# Patient Record
Sex: FEMALE | Race: WHITE | Employment: UNEMPLOYED | URBAN - METROPOLITAN AREA
[De-identification: names, ages, dates, MRNs, and addresses within clinical notes are randomized per-mention and may not be internally consistent; named-entity substitution may affect disease eponyms.]

---

## 2019-05-27 ENCOUNTER — HOSPITAL ENCOUNTER (EMERGENCY)
Age: 39
Discharge: LEFT BEFORE DISCHARGE | End: 2019-05-27
Attending: EMERGENCY MEDICINE
Payer: COMMERCIAL

## 2019-05-27 ENCOUNTER — APPOINTMENT (OUTPATIENT)
Dept: GENERAL RADIOLOGY | Age: 39
End: 2019-05-27
Attending: EMERGENCY MEDICINE
Payer: COMMERCIAL

## 2019-05-27 VITALS
WEIGHT: 123 LBS | HEART RATE: 88 BPM | TEMPERATURE: 98 F | OXYGEN SATURATION: 100 % | BODY MASS INDEX: 20.49 KG/M2 | SYSTOLIC BLOOD PRESSURE: 100 MMHG | HEIGHT: 65 IN | DIASTOLIC BLOOD PRESSURE: 55 MMHG | RESPIRATION RATE: 14 BRPM

## 2019-05-27 DIAGNOSIS — R07.9 CHEST PAIN OF UNCERTAIN ETIOLOGY: Primary | ICD-10-CM

## 2019-05-27 PROCEDURE — 81025 URINE PREGNANCY TEST: CPT

## 2019-05-27 PROCEDURE — 93010 ELECTROCARDIOGRAM REPORT: CPT

## 2019-05-27 PROCEDURE — 84443 ASSAY THYROID STIM HORMONE: CPT | Performed by: EMERGENCY MEDICINE

## 2019-05-27 PROCEDURE — 96360 HYDRATION IV INFUSION INIT: CPT

## 2019-05-27 PROCEDURE — 85025 COMPLETE CBC W/AUTO DIFF WBC: CPT | Performed by: EMERGENCY MEDICINE

## 2019-05-27 PROCEDURE — 85652 RBC SED RATE AUTOMATED: CPT | Performed by: EMERGENCY MEDICINE

## 2019-05-27 PROCEDURE — 83690 ASSAY OF LIPASE: CPT | Performed by: EMERGENCY MEDICINE

## 2019-05-27 PROCEDURE — 99285 EMERGENCY DEPT VISIT HI MDM: CPT

## 2019-05-27 PROCEDURE — 71045 X-RAY EXAM CHEST 1 VIEW: CPT | Performed by: EMERGENCY MEDICINE

## 2019-05-27 PROCEDURE — 80053 COMPREHEN METABOLIC PANEL: CPT | Performed by: EMERGENCY MEDICINE

## 2019-05-27 PROCEDURE — 84484 ASSAY OF TROPONIN QUANT: CPT | Performed by: EMERGENCY MEDICINE

## 2019-05-27 PROCEDURE — 81003 URINALYSIS AUTO W/O SCOPE: CPT | Performed by: EMERGENCY MEDICINE

## 2019-05-27 PROCEDURE — 93005 ELECTROCARDIOGRAM TRACING: CPT

## 2019-05-27 PROCEDURE — 84439 ASSAY OF FREE THYROXINE: CPT | Performed by: EMERGENCY MEDICINE

## 2019-05-27 RX ORDER — ONDANSETRON 2 MG/ML
4 INJECTION INTRAMUSCULAR; INTRAVENOUS ONCE
Status: DISCONTINUED | OUTPATIENT
Start: 2019-05-27 | End: 2019-05-28

## 2019-05-27 RX ORDER — KETOROLAC TROMETHAMINE 30 MG/ML
15 INJECTION, SOLUTION INTRAMUSCULAR; INTRAVENOUS ONCE
Status: DISCONTINUED | OUTPATIENT
Start: 2019-05-27 | End: 2019-05-28

## 2019-05-27 RX ORDER — ASPIRIN 81 MG/1
324 TABLET, CHEWABLE ORAL ONCE
Status: COMPLETED | OUTPATIENT
Start: 2019-05-27 | End: 2019-05-27

## 2019-05-27 RX ORDER — HYDROMORPHONE HYDROCHLORIDE 1 MG/ML
0.5 INJECTION, SOLUTION INTRAMUSCULAR; INTRAVENOUS; SUBCUTANEOUS EVERY 30 MIN PRN
Status: DISCONTINUED | OUTPATIENT
Start: 2019-05-27 | End: 2019-05-28

## 2019-05-28 NOTE — ED NOTES
Patient states pain has resolved since receiving aspirin. She declines Zofran, Toradol, and dilaudid.

## 2019-05-28 NOTE — ED INITIAL ASSESSMENT (HPI)
Patient c/o mid sternal chest pain that radiates to mid back starting one hour ago. +nausea. States that pain worsens with deep inspiration.

## 2019-05-28 NOTE — ED PROVIDER NOTES
Patient Seen in: THE [facility] Emergency Department In [place]    History   Patient presents with:  Chest Pain Angina (cardiovascular)    Stated Complaint: CHEST PAIN    HPI    About an hour before coming to the hospital the patient developed sharp pain to t Palpation of the right sternal border does seem to reproduce the patient's pain. Heart: Apical pulse 84 and regular without murmur or rub. Abdomen: Soft and nontender without mass or HSM.   No CVA tenderness  Extremities: No peripheral edema or evidence o MDM     CTA chest ordered to rule out with serious issues such as pulmonary embolism or aortic dissection. While waiting for testing to be done the patient noted that the pain completely resolved and she was feeling fine.   At that point she decided

## 2019-12-22 NOTE — LETTER
Date & Time: 5/27/2019, 11:13 PM  Patient: Emma Headley  Encounter Provider(s):    Boston Ryan MD         This certifies that I, Emma Headley, a patient at an [facility], am leaving the facility voluntarily and against th
Statement Selected

## 2021-06-11 ENCOUNTER — TELEPHONE (OUTPATIENT)
Dept: GASTROENTEROLOGY | Age: 41
End: 2021-06-11

## 2021-06-30 ENCOUNTER — OFFICE VISIT (OUTPATIENT)
Dept: GASTROENTEROLOGY | Age: 41
End: 2021-06-30

## 2021-06-30 ENCOUNTER — TELEPHONE (OUTPATIENT)
Dept: GASTROENTEROLOGY | Age: 41
End: 2021-06-30

## 2021-06-30 VITALS — BODY MASS INDEX: 20.16 KG/M2 | WEIGHT: 121 LBS | HEIGHT: 65 IN

## 2021-06-30 DIAGNOSIS — K52.9 CHRONIC DIARRHEA: Primary | ICD-10-CM

## 2021-06-30 PROCEDURE — 99204 OFFICE O/P NEW MOD 45 MIN: CPT | Performed by: INTERNAL MEDICINE

## 2021-06-30 RX ORDER — SOD SULF/POT CHLORIDE/MAG SULF 1.479 G
12 TABLET ORAL SEE ADMIN INSTRUCTIONS
Qty: 24 TABLET | Refills: 0 | Status: SHIPPED | OUTPATIENT
Start: 2021-06-30

## 2021-06-30 RX ORDER — CEPHRADINE 500 MG
1 CAPSULE ORAL DAILY
COMMUNITY

## 2021-06-30 RX ORDER — CHOLECALCIFEROL (VITAMIN D3) 1250 MCG
1 CAPSULE ORAL
COMMUNITY

## 2021-06-30 ASSESSMENT — ENCOUNTER SYMPTOMS
SORE THROAT: 0
DIAPHORESIS: 0
SPEECH DIFFICULTY: 0
CONSTIPATION: 0
BACK PAIN: 0
BLOOD IN STOOL: 0
LIGHT-HEADEDNESS: 0
TREMORS: 0
HEADACHES: 0
UNEXPECTED WEIGHT CHANGE: 0
ABDOMINAL PAIN: 0
APPETITE CHANGE: 0
SEIZURES: 0
EYE REDNESS: 0
CHEST TIGHTNESS: 0
NAUSEA: 0
FATIGUE: 0
DIARRHEA: 1
ANAL BLEEDING: 0
BRUISES/BLEEDS EASILY: 0
COUGH: 0
RECTAL PAIN: 0
CHOKING: 0
ABDOMINAL DISTENTION: 1
CONFUSION: 0
COLOR CHANGE: 0
CHILLS: 0
SHORTNESS OF BREATH: 0
VOMITING: 0
EYE PAIN: 0

## 2021-06-30 ASSESSMENT — PAIN SCALES - GENERAL: PAINLEVEL: 0

## 2021-07-02 ENCOUNTER — APPOINTMENT (OUTPATIENT)
Dept: URGENT CARE | Age: 41
End: 2021-07-02

## 2021-07-06 ENCOUNTER — APPOINTMENT (OUTPATIENT)
Dept: GASTROENTEROLOGY | Age: 41
End: 2021-07-06
Attending: INTERNAL MEDICINE

## 2021-08-04 PROBLEM — K52.9 CHRONIC DIARRHEA: Status: ACTIVE | Noted: 2021-06-30

## 2024-07-24 ENCOUNTER — HOSPITAL ENCOUNTER (OUTPATIENT)
Age: 44
Discharge: HOME | End: 2024-07-24
Payer: COMMERCIAL

## 2024-07-24 VITALS
RESPIRATION RATE: 18 BRPM | SYSTOLIC BLOOD PRESSURE: 94 MMHG | HEART RATE: 84 BPM | DIASTOLIC BLOOD PRESSURE: 54 MMHG | OXYGEN SATURATION: 98 % | TEMPERATURE: 98 F

## 2024-07-24 DIAGNOSIS — H65.03 BILATERAL ACUTE SEROUS OTITIS MEDIA, RECURRENCE NOT SPECIFIED: Primary | ICD-10-CM

## 2024-07-24 PROCEDURE — 99204 OFFICE O/P NEW MOD 45 MIN: CPT | Performed by: NURSE PRACTITIONER

## 2024-07-24 RX ORDER — IVERMECTIN 3 MG/1
TABLET ORAL
COMMUNITY
Start: 2024-06-06

## 2024-07-24 NOTE — DISCHARGE INSTRUCTIONS
Claritin D 1 tablet once a day for 7 days  Flonase nasal spray 2 sprays in each nostril daily  Return for worsening symptoms  Follow-up with ENT in 7 days if no improvement

## 2024-07-24 NOTE — ED PROVIDER NOTES
Chief Complaint   Patient presents with    Ear Problem       HPI:     Evis Budina is a 44 year old female who presents with a chief complaint of left ear pain that started this morning.  She reports some nasal congestion earlier however that has since resolved.  She started taking Claritin-D this morning.  She has had no fever.  No URI symptoms.    PFSH  PFSH asessment screens reviewed and agree.  Nursing note reviewed and I agree with documentation.    Family History   Problem Relation Age of Onset    Breast Cancer Mother 38    Colon Polyps Father     Breast Cancer Maternal Grandmother 70    Breast Cancer Maternal Aunt 42     Family history reviewed with patient/caregiver and is not pertinent to presenting problem.  Social History     Socioeconomic History    Marital status:      Spouse name: Not on file    Number of children: Not on file    Years of education: Not on file    Highest education level: Not on file   Occupational History    Not on file   Tobacco Use    Smoking status: Never    Smokeless tobacco: Never   Vaping Use    Vaping status: Never Used   Substance and Sexual Activity    Alcohol use: No    Drug use: No    Sexual activity: Not on file   Other Topics Concern    Not on file   Social History Narrative    Not on file     Social Determinants of Health     Financial Resource Strain: Not on file   Food Insecurity: Not on file   Transportation Needs: Not on file   Physical Activity: Not on file   Stress: Not on file   Social Connections: Not on file   Housing Stability: Not on file         ROS:   Positive for stated complaint: ear problem  All other systems reviewed and negative except as noted above.  Constitutional and Vital Signs Reviewed.      Physical Exam:     Findings:    BP 94/54   Pulse 84   Temp 97.9 °F (36.6 °C) (Temporal)   Resp 18   LMP 07/16/2024 (Approximate)   SpO2 98%   GENERAL: well developed, well nourished, well hydrated, no distress  HEAD: normocephalic  NECK: supple, no  adenopathy  EYES: sclera non icteric bilateral, conjunctiva clear  EARS: bilateral TMs with clear fluid.  Normal external canals bilaterally.  No mastoid tenderness bilaterally.  NOSE: nasal turbinates: swollen, red, and clear drainage  THROAT: clear, without exudates  CARDIO: RRR without murmur  LUNGS: clear to auscultation bilaterally; no rales, rhonchi, or wheezes  EXTREMITIES: no cyanosis or edema. KIRK without difficulty  SKIN: good skin turgor, no obvious rashes    MDM/Assessment/Plan:   Orders for this encounter:    No orders of the defined types were placed in this encounter.      Labs performed this visit:  No results found for this or any previous visit (from the past 10 hour(s)).    MDM:  Medical Decision Making  Differentials considered: Otitis media versus otitis externa versus serous otitis versus mastoiditis versus other    HPI and exam most consistent with bilateral serous otitis.  No signs of infection on exam.  No mastoid tenderness bilaterally.  Discussed Claritin-D along with Flonase.  Advised follow-up with ENT in 1 week if no improvement.  Patient verbalized understanding and agreeable to plan care.    Risk  OTC drugs.        Diagnosis:    ICD-10-CM    1. Bilateral acute serous otitis media, recurrence not specified  H65.03           All results reviewed and discussed with patient.  See AVS for detailed discharge instructions for your condition today.    Follow Up with:  Trae Andrews MD  [address] [place] [address] [id number] [phone]

## 2024-09-21 ENCOUNTER — APPOINTMENT (OUTPATIENT)
Dept: CT IMAGING | Facility: HOSPITAL | Age: 44
End: 2024-09-21
Attending: NURSE PRACTITIONER
Payer: COMMERCIAL

## 2024-09-21 ENCOUNTER — APPOINTMENT (OUTPATIENT)
Dept: GENERAL RADIOLOGY | Age: 44
End: 2024-09-21
Attending: NURSE PRACTITIONER
Payer: COMMERCIAL

## 2024-09-21 ENCOUNTER — HOSPITAL ENCOUNTER (OUTPATIENT)
Age: 44
Discharge: HOME | End: 2024-09-21
Payer: COMMERCIAL

## 2024-09-21 VITALS
OXYGEN SATURATION: 100 % | SYSTOLIC BLOOD PRESSURE: 116 MMHG | HEART RATE: 76 BPM | RESPIRATION RATE: 20 BRPM | DIASTOLIC BLOOD PRESSURE: 46 MMHG | TEMPERATURE: 99 F

## 2024-09-21 DIAGNOSIS — S52.612A CLOSED DISPLACED FRACTURE OF STYLOID PROCESS OF LEFT ULNA, INITIAL ENCOUNTER: ICD-10-CM

## 2024-09-21 DIAGNOSIS — S62.115A NONDISPLACED FRACTURE OF TRIQUETRUM (CUNEIFORM) BONE, LEFT WRIST, INITIAL ENCOUNTER FOR CLOSED FRACTURE: ICD-10-CM

## 2024-09-21 DIAGNOSIS — S52.572A OTHER CLOSED INTRA-ARTICULAR FRACTURE OF DISTAL END OF LEFT RADIUS, INITIAL ENCOUNTER: Primary | ICD-10-CM

## 2024-09-21 PROCEDURE — 29125 APPL SHORT ARM SPLINT STATIC: CPT | Performed by: NURSE PRACTITIONER

## 2024-09-21 PROCEDURE — A4565 SLINGS: HCPCS | Performed by: NURSE PRACTITIONER

## 2024-09-21 PROCEDURE — 73110 X-RAY EXAM OF WRIST: CPT | Performed by: NURSE PRACTITIONER

## 2024-09-21 PROCEDURE — 73200 CT UPPER EXTREMITY W/O DYE: CPT | Performed by: NURSE PRACTITIONER

## 2024-09-21 PROCEDURE — 73630 X-RAY EXAM OF FOOT: CPT | Performed by: NURSE PRACTITIONER

## 2024-09-21 PROCEDURE — 99213 OFFICE O/P EST LOW 20 MIN: CPT | Performed by: NURSE PRACTITIONER

## 2024-09-21 NOTE — ED QUICK NOTES
Bed: 16  Expected date: 12/23/23  Expected time:   Means of arrival: [facility]  Comments:  51 year old female coming from home c/o bilateral leg pain. H/O psych issues.  Is refusing all vital signs at this time.    Report received by Danay FORBES   Pt transferred by provider to [facility] for outpatient testing.

## 2024-09-21 NOTE — ED PROVIDER NOTES
Patient Seen in: [facility]    History   CC: fall   HPI: Evis Budina 44 year old female  who presents c/o left wrist and foot pain status post fall today approximately noon in which patient states she missed her footing on the last 2 steps of stairs going into her garage.  Denies hitting her head or loss of consciousness.  Denies neck or back pain.  Difficulty with any range of motion at the level of left wrist due to discomfort.  Denies open wounds.  Driven here by her son.    History reviewed. No pertinent past medical history.    Past Surgical History:   Procedure Laterality Date          Colonoscopy N/A 2021       Family History   Problem Relation Age of Onset    Breast Cancer Mother 38    Colon Polyps Father     Breast Cancer Maternal Grandmother 70    Breast Cancer Maternal Aunt 42       Social History     Socioeconomic History    Marital status:    Tobacco Use    Smoking status: Never    Smokeless tobacco: Never   Vaping Use    Vaping status: Never Used   Substance and Sexual Activity    Alcohol use: No    Drug use: No       ROS:  Review of Systems    Positive for stated complaint: Fall  Other systems are as noted in HPI.  Constitutional and vital signs reviewed.      All other systems reviewed and negative except as noted above.    PSFH elements reviewed from today and agreed except as otherwise stated in HPI.             Constitutional and vital signs reviewed.        Physical Exam     ED Triage Vitals   BP 24 1234 116/46   Pulse 24 1234 76   Resp 24 1234 20   Temp 24 1234 98.6 °F (37 °C)   Temp src 24 1234 Temporal   SpO2 24 1234 100 %   O2 Device 24 1232 None (Room air)       Current:/46   Pulse 76   Temp 98.6 °F (37 °C) (Temporal)   Resp 20   LMP 2024 (Exact Date)   SpO2 100%         PE:  General - Appears well, non-toxic and in NAD  Head - Appears symmetrical without deformity/swelling cranium, scalp, or facial  bones  Eyes - sclera not injected, no discharge noted, no periorbital edema  Neck - supple with trachea midline, no tenderness upon palpation to posterior c-spine, no obvious sign of trauma/swelling/step-off, full ROM with strong motor strength against resistance  Resp - Lung sounds clear bilaterally and wob unlabored, good aeration with equal, even expansion bilaterally   CV - RRR  Skin - no rashes or petechiae noted, pink warm and dry throughout, mmm, cap refill <2seconds  Neuro - A&O x4, sensation equal to both medial and lateral aspects of extremities, steady gait  MSK - makes purposeful movements of all extremities however difficulty with range of motion at the level of the left wrist due to discomfort, finger flexion/extension as well as foot plantar flexion/dorsiflexion strength equal bilat, radial and pedal pulses 2+ bilat.  no midline spinal tenderness or obvious sign of trauma/swelling/deformity, +flexion of the 1st and 5th toes bilat.  + Tenderness is elicited with palpation to the left wrist diffuse.  No hand overlying metacarpals 1 through 5, forearm, elbow, upper arm, shoulder of the left upper extremity on exam.  + Tenderness is elicited with palpation to the proximal, dorsal left foot overlying base of the metatarsals 3 and 4.  No other foot or toe tenderness.  No tenderness in the left ankle, shin, calf, knee, thigh or hip left lower extremity.  Psych - Interactive and appropriate      ED Course   Labs Reviewed - No data to display    MDM     XR WRIST COMPLETE (MIN 3 VIEWS), LEFT (CPT=73110) (Final result)  Result time 09/21/24 13:18:46  Final result by Hernandez Draper MD (09/21/24 13:18:46)                Impression:    CONCLUSION:  1. Acute comminuted fractures of the distal left radial metaphysis with intra-articular extension to the radiocarpal joint.     2. Acute displaced fracture of the left ulnar styloid process.           Dictated by (CST): Hernandez Draper MD on 9/21/2024 at 1:17 PM       Finalized by (CST): Hernandez Draper MD on 9/21/2024 at 1:18 PM                  Narrative:    PROCEDURE: XR WRIST COMPLETE (MIN 3 VIEWS), LEFT (CPT=73110)     COMPARISON: None available.     INDICATIONS: Dorsal and radial left wrist pain and swelling after sustaining a fall delete.     TECHNIQUE: 3 views were obtained.       FINDINGS:  BONES: A transversely oriented comminuted and slightly impacted fracture of the distal left radial metaphysis is apparent. Intra-articular extension to the radiocarpal joint is suggested. A minimally displaced acute ulnar styloid process fracture is  noted. There is no evidence of significant arthropathy. The intercarpal distances are preserved. The carpal arcs are well aligned.  SOFT TISSUES: Circumferential soft tissue swelling is apparent. Negative for discernible radiopaque foreign body.  EFFUSION: None visible.                                       XR FOOT, COMPLETE (MIN 3 VIEWS), LEFT (CPT=73630) (Final result)  Result time 09/21/24 13:23:01  Final result by Hernandez Draper MD (09/21/24 13:23:01)                Impression:    CONCLUSION:  1. No radiographically visible acute osseous injury of the left foot.     2. 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     3. Left calcaneal enthesopathy.           Dictated by (CST): Hernandez Draper MD on 9/21/2024 at 1:21 PM      Finalized by (CST): Hernandez Draper MD on 9/21/2024 at 1:22 PM                  Narrative:    PROCEDURE: XR FOOT, COMPLETE (MIN 3 VIEWS), LEFT (CPT=73630)     COMPARISON: None available.     INDICATIONS: Pain along lateral aspect of the left foot after sustaining a fall.     TECHNIQUE: 3 views were obtained without weightbearing technique.       FINDINGS:  BONES: No acute fracture or dislocation is evident. No suspicious osseous lesions are seen. The interphalangeal joint spaces are narrowed with evidence of mild degenerative arthropathy. There is mild degeneration of the hallux-sesamoid  complex. Calcaneal   enthesopathy is evident at the insertion of the Achilles tendon. An accessory os perineum is suggested.  SOFT TISSUES: Negative for visible soft tissue swelling or radiopaque foreign body.    EFFUSION: None visible.                 CT WRIST LEFT (CPT=73200) (Final result)  Result time 09/21/24 15:33:57  Final result by Charles Estevez MD (09/21/24 15:33:57)                Impression:    CONCLUSION:  1. Acute mildly displaced and comminuted distal radius fracture with intra-articular extension.  There is slight dorsal angulation of the principal distal fragment (Colles fracture).  2. Additional acute minimally displaced fracture at the tip of the ulnar styloid process.  3. There is also an acute nondisplaced fracture along the dorsal margin of the triquetrum.  4. Lesser incidental findings as above.        Dictated by (CST): Charles Estevez MD on 9/21/2024 at 3:31 PM      Finalized by (CST): Charles Estevez MD on 9/21/2024 at 3:33 PM                  Narrative:    PROCEDURE: CT WRIST LEFT (CPT=73200)     COMPARISON: [facility], XR WRIST COMPLETE (MIN 3 VIEWS), LEFT (CPT=73110), 9/21/2024, 12:49 PM.     INDICATIONS: fall down two stairs today. +fx comminuted distal left radial metaphysis with extension into the radiocarpal joint     TECHNIQUE: Multi-planar CT images of the left wrist were obtained without intravenous contrast material.  Automated exposure control for dose reduction was used. Adjustment of the mA and/or kV was done based on the patient's size. Use of iterative  reconstruction technique for dose reduction was used.  Dose information is transmitted to the ACR (American College of Radiology) NRDR (National Radiology Data Registry) which includes the Dose Index Registry.     FINDINGS:  BONES: Acute displaced and comminuted distal radius fracture with intra-articular extension.  There is also an acute minimally displaced fracture at the ulnar styloid  process.  Additional acute nondisplaced fracture along the dorsal margin of the  triquetrum.  Demineralization.  SOFT TISSUES: Radiocarpal and probable distal radial ulnar joint effusions.  OTHER: Overlying cast material reduces sensitivity for fine detail.              DDx: Fracture versus sprain versus contusion    X-ray results as noted above and independently reviewed by this provider.  No acute osseous abnormality in the left foot however there is a displaced ulnar styloid process fracture as well as left distal radial metaphysis fracture with intra-articular extension.  This was discussed with Dr. Urban Smith, upper extremity orthopedist with [facility] as well as images of x-ray sent and message.  He is unsure whether patient is candidate for conservative management versus surgical management and requests CT for close follow-up with his office in the next few days.  CT obtained and results as noted above.  Additional triquetrium fracture, nondisplaced noted on CT per radiologist.  Patient is aware CT results will not change outcome of today's visit however we will need this for follow-up with orthopedist.  General wrist fracture and OCL instructions, sling instructions and precautions, rest, ice, elevation, Tylenol or Motrin as needed for discomfort, follow-up and return/ED precautions reviewed.  Patient offered Norco while in the immediate care and she declines.  Patient is historian and demonstrates understanding of all instruction and agrees with plan of care.    A short arm OCL splint was applied. After application of the splint I returned and re-examined the patient. The splint was adequately immobilizing the joint and distal to the splint the patient's circulation and sensation was intact.    Disposition and Plan     Clinical Impression:  1. Other closed intra-articular fracture of distal end of left radius, initial encounter    2. Closed displaced fracture of styloid process of left ulna, initial  encounter    3. Nondisplaced fracture of triquetrum (cuneiform) bone, left wrist, initial encounter for closed fracture        Disposition:  Discharge    Follow-up:  Urban Smith MD  [address],  [place] [address] [phone]    Go in 3 days        Medications Prescribed:  Discharge Medication List as of 9/21/2024  2:02 PM

## 2024-09-21 NOTE — ED INITIAL ASSESSMENT (HPI)
Pt states she was going down 2 steps into garage and fell onto concrete floor at 12pm today.  Pt c/o L wrist and L foot pain.  No head injury.  No LOC.  Ibuprofen last dose at 1215pm today.

## 2024-09-24 ENCOUNTER — TELEPHONE (OUTPATIENT)
Facility: CLINIC | Age: 44
End: 2024-09-24

## 2024-09-24 DIAGNOSIS — M25.532 LEFT WRIST PAIN: Primary | ICD-10-CM

## 2024-09-24 NOTE — TELEPHONE ENCOUNTER
Patient scheduled via [employer]. XR and CT from 9/21/24 in epic. Please review imaging and advise if appt is ok and if you would like repeat imaging.

## 2024-09-24 NOTE — TELEPHONE ENCOUNTER
[facility] Imaging in epic, please advise if additional imaging needed.     Future Appointments   Date Time Provider Department Center   9/25/2024  2:00 PM Urban Smith MD [facility] [place]

## 2024-09-25 ENCOUNTER — HOSPITAL ENCOUNTER (OUTPATIENT)
Dept: GENERAL RADIOLOGY | Age: 44
Discharge: HOME | End: 2024-09-25
Attending: ORTHOPAEDIC SURGERY
Payer: COMMERCIAL

## 2024-09-25 ENCOUNTER — OFFICE VISIT (OUTPATIENT)
Dept: ORTHOPEDICS CLINIC | Facility: CLINIC | Age: 44
End: 2024-09-25
Payer: COMMERCIAL

## 2024-09-25 VITALS — WEIGHT: 125 LBS | HEIGHT: 65 IN | BODY MASS INDEX: 20.83 KG/M2

## 2024-09-25 DIAGNOSIS — M25.532 LEFT WRIST PAIN: ICD-10-CM

## 2024-09-25 DIAGNOSIS — S52.572A OTHER CLOSED INTRA-ARTICULAR FRACTURE OF DISTAL END OF LEFT RADIUS, INITIAL ENCOUNTER: Primary | ICD-10-CM

## 2024-09-25 PROCEDURE — 73110 X-RAY EXAM OF WRIST: CPT | Performed by: ORTHOPAEDIC SURGERY

## 2024-09-25 PROCEDURE — 99204 OFFICE O/P NEW MOD 45 MIN: CPT | Performed by: ORTHOPAEDIC SURGERY

## 2024-09-25 PROCEDURE — 3008F BODY MASS INDEX DOCD: CPT | Performed by: ORTHOPAEDIC SURGERY

## 2024-09-25 NOTE — H&P
Clinic Note     Assessment/Plan:  44 year old female    Left distal radius fracture without ulnar styloid fracture - intra-articular -0 degrees of dorsal angulation.  Based on the CT scan, current alignment is amenable to nonsurgical management with satisfactory functional outcome.  We will proceed with close radiographic evaluation to ensure further displacement does not occur.  Patient was maintained in the current splint.  Follow-up in 1 week for repeat x-rays.  At that visit she will be transition to an Exos wrist brace.   Intermittent left hand numbness-recommend continued observation.  Expect with decreased swelling resolution of numbness.    Follow Up: 1 week x-rays of wrist out of splint    Diagnostic Studies:     XR and CT of Left Wrist 3V: Intra-articular minimally dorsally angulated distal radius fracture.  0 degrees of dorsal angulation       Physical Exam:     Ht 5' 5\" (1.651 m)   Wt 125 lb (56.7 kg)   LMP 09/21/2024 (Exact Date)   BMI 20.80 kg/m²     Constitutional: NAD. AOx3. Well-developed and Well-nourished.   Psychiatric: Normal mood/ affect/ behavior. Judgment and thought content normal.     Left Upper Extremity:     Inspection    Skin intact. + Swelling and ecchymosis. - Wrist deformity   Palpation    TTP at the fracture site      ROM     Elbow motion:    Normal symmetric       Wrist motion:    Ext: deferred to injury  Flex: deferred to injury  Pro: deferred to injury  Sup: deferred to injury     Finger motion:    One half composite fist        Neurovascular    Normal sensation in the median, ulnar, and radial nerve distribution. Normal motor function of muscles innervated by median/AIN, ulnar, and radial/PIN nerves.    Normally perfused hand(s).          CC: Left distal radius fracture    HPI: This 44 year old RHD female presents with a distal radius fracture after a fall on 9/21/2024.  Patient fell down a stair and try to catch her self with her left hand.  Patient reports mild pain.   Some dysesthesias that are intermittent.    History/Other:   No past medical history on file.  Past Surgical History:   Procedure Laterality Date          Colonoscopy N/A 2021     Current Outpatient Medications   Medication Sig Dispense Refill    Ivermectin 3 MG Oral Tab TAKE 8 TABLETS BY MOUTH EVERY OTHER DAY FOR 3 DOSES (Patient not taking: Reported on 2024)       No Known Allergies  Family History   Problem Relation Age of Onset    Breast Cancer Mother 38    Colon Polyps Father     Breast Cancer Maternal Grandmother 70    Breast Cancer Maternal Aunt 42     Social History     Occupational History    Not on file   Tobacco Use    Smoking status: Never    Smokeless tobacco: Never   Vaping Use    Vaping status: Never Used   Substance and Sexual Activity    Alcohol use: No    Drug use: No    Sexual activity: Not on file        Review of Systems (negative unless bolded):  General: fevers, chills, fatigue  CV:  chest pain, palpitations, leg swelling  Msk: bodyaches, neck pain, neck stiffness  Skin: rashes, open wounds, nonhealing ulcers  Hem: bleeds easily, bruise easily, immunocompromised  Neuro: dizziness, light headedness, headaches  Psych: anxious, depressed, anger issues    Urban Smith MD   Hand, Wrist, & Elbow Surgery  [email or web address]  t: [phone]  f: [phone]

## 2024-09-28 ENCOUNTER — HOSPITAL ENCOUNTER (OUTPATIENT)
Age: 44
Discharge: NURSING, REHAB OR LONG-TERM CARE | End: 2024-09-28
Payer: COMMERCIAL

## 2024-09-28 VITALS
HEART RATE: 88 BPM | SYSTOLIC BLOOD PRESSURE: 114 MMHG | OXYGEN SATURATION: 99 % | TEMPERATURE: 99 F | RESPIRATION RATE: 18 BRPM | DIASTOLIC BLOOD PRESSURE: 58 MMHG

## 2024-09-28 DIAGNOSIS — R10.2 PELVIC PAIN: Primary | ICD-10-CM

## 2024-09-28 LAB
B-HCG UR QL: NEGATIVE
BILIRUB UR QL STRIP: NEGATIVE
GLUCOSE UR STRIP-MCNC: NEGATIVE MG/DL
HGB UR QL STRIP: NEGATIVE
KETONES UR STRIP-MCNC: NEGATIVE MG/DL
LEUKOCYTE ESTERASE UR QL STRIP: NEGATIVE
NITRITE UR QL STRIP: POSITIVE
PH UR STRIP: 7 [PH]
PROT UR STRIP-MCNC: NEGATIVE MG/DL
SP GR UR STRIP: 1.01
UROBILINOGEN UR STRIP-ACNC: <2 MG/DL

## 2024-09-28 PROCEDURE — 81025 URINE PREGNANCY TEST: CPT | Performed by: NURSE PRACTITIONER

## 2024-09-28 PROCEDURE — 81002 URINALYSIS NONAUTO W/O SCOPE: CPT | Performed by: NURSE PRACTITIONER

## 2024-09-28 PROCEDURE — 99215 OFFICE O/P EST HI 40 MIN: CPT | Performed by: NURSE PRACTITIONER

## 2024-09-28 NOTE — ED PROVIDER NOTES
Patient Seen in: [facility]      History     Chief Complaint   Patient presents with    Abdominal Pain     Stated Complaint: Pelvic pain  Subjective:   HPI    This is a 44-year-old who presents with suprapubic pain which started earlier today.  Denies any dysuria but does state urgency secondary to pelvic pressure.  Denies any fever, no hematuria.  Still gets a regular period.     Objective:   History reviewed. No pertinent past medical history.         Past Surgical History:   Procedure Laterality Date          Colonoscopy N/A 2021              Social History     Socioeconomic History    Marital status:    Tobacco Use    Smoking status: Never    Smokeless tobacco: Never   Vaping Use    Vaping status: Never Used   Substance and Sexual Activity    Alcohol use: No    Drug use: No            Review of Systems   All other systems reviewed and are negative.      Positive for stated complaint: Abdominal Pain    Other systems are as noted in HPI.  Constitutional and vital signs reviewed.      All other systems reviewed and negative except as noted above.    Physical Exam     ED Triage Vitals [24 1530]   /58   Pulse 88   Resp 18   Temp 98.9 °F (37.2 °C)   Temp src Temporal   SpO2 99 %   O2 Device None (Room air)     Current:/58   Pulse 88   Temp 98.9 °F (37.2 °C) (Temporal)   Resp 18   LMP 2024 (Exact Date)   SpO2 99%     Physical Exam  Vitals and nursing note reviewed.   Constitutional:       General: She is awake. She is not in acute distress.     Appearance: Normal appearance. She is not ill-appearing, toxic-appearing or diaphoretic.   HENT:      Head: Normocephalic and atraumatic.      Right Ear: Tympanic membrane, ear canal and external ear normal.      Left Ear: Tympanic membrane, ear canal and external ear normal.      Nose: Nose normal.      Mouth/Throat:      Mouth: Mucous membranes are moist.      Pharynx: Oropharynx is clear. Uvula midline.   Eyes:       General: Lids are normal.      Extraocular Movements: Extraocular movements intact.      Conjunctiva/sclera: Conjunctivae normal.      Pupils: Pupils are equal, round, and reactive to light.   Cardiovascular:      Rate and Rhythm: Normal rate and regular rhythm.      Pulses: Normal pulses.      Heart sounds: Normal heart sounds.   Pulmonary:      Effort: Pulmonary effort is normal.      Breath sounds: Normal breath sounds.   Abdominal:      Tenderness: There is abdominal tenderness in the suprapubic area.   Skin:     General: Skin is warm and dry.      Capillary Refill: Capillary refill takes less than 2 seconds.   Neurological:      General: No focal deficit present.      Mental Status: She is alert and oriented to person, place, and time.   Psychiatric:         Mood and Affect: Mood normal.         Behavior: Behavior normal. Behavior is cooperative.         Thought Content: Thought content normal.         Judgment: Judgment normal.         ED Course   Patient screams during abdominal exam and grabs my hand.  Urine reviewed, positive nitrate otherwise unremarkable.  No results found.  Labs Reviewed   [facility] POCT URINALYSIS DIPSTICK - Abnormal; Notable for the following components:       Result Value    Urine Color Orange (*)     Urine Clarity Slightly cloudy (*)     Nitrite Urine Positive (*)     All other components within normal limits   POCT PREGNANCY URINE - Normal       MDM     Medical Decision Making  Differential diagnoses reflecting the complexity of care include but are not limited to cystitis, STI, PID, ovarian cyst, ovarian torsion.    Comorbidities that add complexity to management include: N/A  History obtained by an independent source was from: N/A  Discussions of management was done with: Dr. Perdomo   My independent interpretations of studies include: N/A  Shared decision making was done by: Patient and Myself   Patient is well appearing, non-toxic and in no acute distress.  Vital signs are  stable.  I discussed with the patient based on exam here and how tender she is I do recommend further evaluation in the emergency department.  She will go to [facility] at this time.        Disposition and Plan     Clinical Impression:  1. Pelvic pain         Disposition:  Ic to ed  9/28/2024  3:50 pm    Follow-up:  No follow-up provider specified.        Medications Prescribed:  Discharge Medication List as of 9/28/2024  3:49 PM             Note to patient: The [employer] act makes medical notes like these available to patients in the interest of transparency.  However, be advised this medical document and is intended as peer to peer communication.  It is read the medical language and may contain abbreviations or verbiage that are unfamiliar.  It may appear blunt or direct.  Medical documents are intended to carry relevant information, fax is evident and the clinical opinion of the practitioner.    This note was prepared using Dragon Medical voice recognition dictation software.  As a result, errors may occur.  When identified, these errors have been corrected.  While every attempt is made to correct errors during dictation, discrepancies may still exist.    Milana Rodriguez, APRN  9/28/2024  3:32 PM

## 2024-10-01 ENCOUNTER — TELEPHONE (OUTPATIENT)
Dept: ORTHOPEDICS CLINIC | Facility: CLINIC | Age: 44
End: 2024-10-01

## 2024-10-01 DIAGNOSIS — S52.572A OTHER CLOSED INTRA-ARTICULAR FRACTURE OF DISTAL END OF LEFT RADIUS, INITIAL ENCOUNTER: Primary | ICD-10-CM

## 2024-10-04 ENCOUNTER — HOSPITAL ENCOUNTER (OUTPATIENT)
Dept: GENERAL RADIOLOGY | Age: 44
Discharge: HOME | End: 2024-10-04
Attending: ORTHOPAEDIC SURGERY
Payer: COMMERCIAL

## 2024-10-04 DIAGNOSIS — S52.572A OTHER CLOSED INTRA-ARTICULAR FRACTURE OF DISTAL END OF LEFT RADIUS, INITIAL ENCOUNTER: ICD-10-CM

## 2024-10-04 PROCEDURE — 73110 X-RAY EXAM OF WRIST: CPT | Performed by: ORTHOPAEDIC SURGERY

## 2024-10-09 ENCOUNTER — HOSPITAL ENCOUNTER (OUTPATIENT)
Dept: GENERAL RADIOLOGY | Age: 44
Discharge: HOME | End: 2024-10-09
Attending: ORTHOPAEDIC SURGERY
Payer: COMMERCIAL

## 2024-10-09 ENCOUNTER — TELEPHONE (OUTPATIENT)
Dept: ORTHOPEDICS CLINIC | Facility: CLINIC | Age: 44
End: 2024-10-09

## 2024-10-09 ENCOUNTER — OFFICE VISIT (OUTPATIENT)
Dept: ORTHOPEDICS CLINIC | Facility: CLINIC | Age: 44
End: 2024-10-09
Payer: COMMERCIAL

## 2024-10-09 VITALS — HEIGHT: 65 IN | WEIGHT: 125 LBS | BODY MASS INDEX: 20.83 KG/M2

## 2024-10-09 DIAGNOSIS — S52.572A OTHER CLOSED INTRA-ARTICULAR FRACTURE OF DISTAL END OF LEFT RADIUS, INITIAL ENCOUNTER: Primary | ICD-10-CM

## 2024-10-09 DIAGNOSIS — S52.572A OTHER CLOSED INTRA-ARTICULAR FRACTURE OF DISTAL END OF LEFT RADIUS, INITIAL ENCOUNTER: ICD-10-CM

## 2024-10-09 PROCEDURE — 73110 X-RAY EXAM OF WRIST: CPT | Performed by: ORTHOPAEDIC SURGERY

## 2024-10-09 PROCEDURE — 3008F BODY MASS INDEX DOCD: CPT | Performed by: ORTHOPAEDIC SURGERY

## 2024-10-09 PROCEDURE — 99213 OFFICE O/P EST LOW 20 MIN: CPT | Performed by: ORTHOPAEDIC SURGERY

## 2024-10-09 NOTE — PROGRESS NOTES
Clinic Note     Assessment/Plan:  44 year old female    Left distal radius fracture without ulnar styloid fracture - intra-articular -0 degrees of dorsal angulation.  No significant interval displacement.  Transition to Exos brace.  Patient to follow-up in 2 weeks with repeat x-rays  Intermittent left hand numbness-recommend continued observation.  Expect with decreased swelling resolution of numbness.    Follow Up: 2 week x-rays of wrist out of splint    Diagnostic Studies:     XR and CT of Left Wrist 3V: Intra-articular minimally dorsally angulated distal radius fracture.  0 degrees of dorsal angulation       Physical Exam:     Ht 5' 5\" (1.651 m)   Wt 125 lb (56.7 kg)   LMP 2024 (Exact Date)   BMI 20.80 kg/m²     Constitutional: NAD. AOx3. Well-developed and Well-nourished.   Psychiatric: Normal mood/ affect/ behavior. Judgment and thought content normal.     Left Upper Extremity:     Inspection    Skin intact. + Swelling and ecchymosis. - Wrist deformity   Palpation    TTP at the fracture site      ROM     Elbow motion:    Normal symmetric       Wrist motion:    Ext: deferred to injury  Flex: deferred to injury  Pro: deferred to injury  Sup: deferred to injury     Finger motion:    2/3 composite fist        Neurovascular    Normal sensation in the median, ulnar, and radial nerve distribution. Normal motor function of muscles innervated by median/AIN, ulnar, and radial/PIN nerves.    Normally perfused hand(s).          CC: Left distal radius fracture    HPI: This 44 year old RHD female presents with a distal radius fracture after a fall on 2024.  Patient fell down a stair and try to catch her self with her left hand.  Patient reports mild pain.  Some dysesthesias that are intermittent.    Insert interval history: Patient reports pain is improving.    History/Other:   No past medical history on file.  Past Surgical History:   Procedure Laterality Date          Colonoscopy N/A 2021      Current Outpatient Medications   Medication Sig Dispense Refill    Ivermectin 3 MG Oral Tab TAKE 8 TABLETS BY MOUTH EVERY OTHER DAY FOR 3 DOSES (Patient not taking: Reported on 7/24/2024)       No Known Allergies  Family History   Problem Relation Age of Onset    Breast Cancer Mother 38    Colon Polyps Father     Breast Cancer Maternal Grandmother 70    Breast Cancer Maternal Aunt 42     Social History     Occupational History    Not on file   Tobacco Use    Smoking status: Never    Smokeless tobacco: Never   Vaping Use    Vaping status: Never Used   Substance and Sexual Activity    Alcohol use: No    Drug use: No    Sexual activity: Not on file        Review of Systems (negative unless bolded):  General: fevers, chills, fatigue  CV:  chest pain, palpitations, leg swelling  Msk: bodyaches, neck pain, neck stiffness  Skin: rashes, open wounds, nonhealing ulcers  Hem: bleeds easily, bruise easily, immunocompromised  Neuro: dizziness, light headedness, headaches  Psych: anxious, depressed, anger issues    Urban Smith MD   Hand, Wrist, & Elbow Surgery  [email or web address]  t: [phone]  f: [phone]

## 2024-10-11 ENCOUNTER — TELEPHONE (OUTPATIENT)
Facility: CLINIC | Age: 44
End: 2024-10-11

## 2024-10-11 NOTE — TELEPHONE ENCOUNTER
Patient called to get recommendations for pain relief. Patient removed hard cast because of discomfort and pain and replaced it with the sling.contact number [phone]

## 2024-10-21 ENCOUNTER — TELEPHONE (OUTPATIENT)
Dept: ORTHOPEDICS CLINIC | Facility: CLINIC | Age: 44
End: 2024-10-21

## 2024-10-21 NOTE — TELEPHONE ENCOUNTER
Patient called asking if she can have appt r/s to an earlier time on 10/23 due to a conflict with her daughter's school  time. Patient states she was under the impression that she had the appt at 11:30am. Please advise, thank you.     Future Appointments   Date Time Provider Department Center   10/23/2024  2:30 PM Urban Smith MD [facility]

## 2024-10-23 ENCOUNTER — TELEPHONE (OUTPATIENT)
Dept: ORTHOPEDICS CLINIC | Facility: CLINIC | Age: 44
End: 2024-10-23

## 2024-10-23 ENCOUNTER — OFFICE VISIT (OUTPATIENT)
Dept: ORTHOPEDICS CLINIC | Facility: CLINIC | Age: 44
End: 2024-10-23
Payer: COMMERCIAL

## 2024-10-23 ENCOUNTER — HOSPITAL ENCOUNTER (OUTPATIENT)
Dept: GENERAL RADIOLOGY | Age: 44
Discharge: HOME | End: 2024-10-23
Attending: ORTHOPAEDIC SURGERY
Payer: COMMERCIAL

## 2024-10-23 VITALS — BODY MASS INDEX: 20.83 KG/M2 | HEIGHT: 65 IN | WEIGHT: 125 LBS

## 2024-10-23 DIAGNOSIS — S52.572A OTHER CLOSED INTRA-ARTICULAR FRACTURE OF DISTAL END OF LEFT RADIUS, INITIAL ENCOUNTER: Primary | ICD-10-CM

## 2024-10-23 DIAGNOSIS — S52.572A OTHER CLOSED INTRA-ARTICULAR FRACTURE OF DISTAL END OF LEFT RADIUS, INITIAL ENCOUNTER: ICD-10-CM

## 2024-10-23 PROCEDURE — 73110 X-RAY EXAM OF WRIST: CPT | Performed by: ORTHOPAEDIC SURGERY

## 2024-10-23 PROCEDURE — 3008F BODY MASS INDEX DOCD: CPT | Performed by: ORTHOPAEDIC SURGERY

## 2024-10-23 PROCEDURE — 99213 OFFICE O/P EST LOW 20 MIN: CPT | Performed by: ORTHOPAEDIC SURGERY

## 2024-10-23 NOTE — PROGRESS NOTES
Clinic Note     Assessment/Plan:  44 year old female    Left distal radius fracture without ulnar styloid fracture - intra-articular -0 degrees of dorsal angulation.  Radiographic signs of healing are noted.  Patient can transition out of the bracing and increase activities as tolerated.  Self-directed wrist extension and flexion exercises were shown to the patient.  If in 2 weeks she feels like she is not making significant improvement then we will start her in formal therapy.  Intermittent left hand numbness-recommend continued observation.  Resolved    Follow Up: 4 to 6 weeks for clinical check    Diagnostic Studies:     XR and CT of Left Wrist 3V: Intra-articular minimally dorsally angulated distal radius fracture.  Radiographic signs of healing are noted on x-ray       Physical Exam:     Ht 5' 5\" (1.651 m)   Wt 125 lb (56.7 kg)   LMP 09/21/2024 (Exact Date)   BMI 20.80 kg/m²     Constitutional: NAD. AOx3. Well-developed and Well-nourished.   Psychiatric: Normal mood/ affect/ behavior. Judgment and thought content normal.     Left Upper Extremity:     Inspection    Skin intact.  Decreased swelling and ecchymosis resolution. - Wrist deformity   Palpation    No TTP at the fracture site      ROM     Elbow motion:    Normal symmetric       Wrist motion:    Ext: 30 degrees  Flex: 15  Pro: Full pronation  Sup: Near full supination     Finger motion:    Full composite fist        Neurovascular    Normal sensation in the median, ulnar, and radial nerve distribution. Normal motor function of muscles innervated by median/AIN, ulnar, and radial/PIN nerves.    Normally perfused hand(s).          CC: Left distal radius fracture    HPI: This 44 year old RHD female presents with a distal radius fracture after a fall on 9/21/2024.  Patient fell down a stair and try to catch her self with her left hand.  Patient reports mild pain.  Some dysesthesias that are intermittent.    Interval Hx (10/23/2024): Patient notes  significant improvement in pain around the 4-week lino.    History/Other:   No past medical history on file.  Past Surgical History:   Procedure Laterality Date          Colonoscopy N/A 2021     Current Outpatient Medications   Medication Sig Dispense Refill    Ivermectin 3 MG Oral Tab TAKE 8 TABLETS BY MOUTH EVERY OTHER DAY FOR 3 DOSES (Patient not taking: Reported on 2024)       No Known Allergies  Family History   Problem Relation Age of Onset    Breast Cancer Mother 38    Colon Polyps Father     Breast Cancer Maternal Grandmother 70    Breast Cancer Maternal Aunt 42     Social History     Occupational History    Not on file   Tobacco Use    Smoking status: Never    Smokeless tobacco: Never   Vaping Use    Vaping status: Never Used   Substance and Sexual Activity    Alcohol use: No    Drug use: No    Sexual activity: Not on file        Review of Systems (negative unless bolded):  General: fevers, chills, fatigue  CV:  chest pain, palpitations, leg swelling  Msk: bodyaches, neck pain, neck stiffness  Skin: rashes, open wounds, nonhealing ulcers  Hem: bleeds easily, bruise easily, immunocompromised  Neuro: dizziness, light headedness, headaches  Psych: anxious, depressed, anger issues    Urban Smith MD   Hand, Wrist, & Elbow Surgery  [email or web address]  t: [phone]  f: [phone]

## (undated) NOTE — ED AVS SNAPSHOT
Gaurang Shaikh   MRN: [id number]    Department:  [address] in [place]   Date of Visit:  5/27/2019           Disclosure     Insurance plans vary and the physician(s) referred by the ER may not be covered by your plan.  Please contact yo tell this physician (or your personal doctor if your instructions are to return to your personal doctor) about any new or lasting problems. The primary care or specialist physician will see patients referred from the [facility] Emergency Department.  Dalton Rubi